# Patient Record
Sex: MALE | Race: WHITE | NOT HISPANIC OR LATINO | URBAN - METROPOLITAN AREA
[De-identification: names, ages, dates, MRNs, and addresses within clinical notes are randomized per-mention and may not be internally consistent; named-entity substitution may affect disease eponyms.]

---

## 2021-05-05 ENCOUNTER — WEB ENCOUNTER (OUTPATIENT)
Dept: URBAN - METROPOLITAN AREA CLINIC 74 | Facility: CLINIC | Age: 51
End: 2021-05-05

## 2021-05-05 ENCOUNTER — OFFICE VISIT (OUTPATIENT)
Dept: URBAN - METROPOLITAN AREA CLINIC 74 | Facility: CLINIC | Age: 51
End: 2021-05-05
Payer: MEDICARE

## 2021-05-05 ENCOUNTER — LAB OUTSIDE AN ENCOUNTER (OUTPATIENT)
Dept: URBAN - METROPOLITAN AREA CLINIC 74 | Facility: CLINIC | Age: 51
End: 2021-05-05

## 2021-05-05 DIAGNOSIS — E66.01 MORBID OBESITY: ICD-10-CM

## 2021-05-05 DIAGNOSIS — Z99.81 ON HOME OXYGEN THERAPY: ICD-10-CM

## 2021-05-05 DIAGNOSIS — I50.32 CHRONIC DIASTOLIC CONGESTIVE HEART FAILURE, NYHA CLASS 2: ICD-10-CM

## 2021-05-05 DIAGNOSIS — J96.11 CHRONIC HYPOXEMIC RESPIRATORY FAILURE: ICD-10-CM

## 2021-05-05 PROBLEM — 428283002 HISTORY OF POLYP OF COLON: Status: ACTIVE | Noted: 2021-05-05

## 2021-05-05 PROBLEM — 931000119107: Status: ACTIVE | Noted: 2021-05-05

## 2021-05-05 PROBLEM — 73430006: Status: ACTIVE | Noted: 2021-05-05

## 2021-05-05 PROCEDURE — 99242 OFF/OP CONSLTJ NEW/EST SF 20: CPT | Performed by: INTERNAL MEDICINE

## 2021-05-05 PROCEDURE — 99202 OFFICE O/P NEW SF 15 MIN: CPT | Performed by: INTERNAL MEDICINE

## 2021-05-05 RX ORDER — BUDESONIDE AND FORMOTEROL FUMARATE DIHYDRATE 160; 4.5 UG/1; UG/1
2 PUFFS AEROSOL RESPIRATORY (INHALATION) TWICE A DAY
Status: ACTIVE | COMMUNITY

## 2021-05-05 RX ORDER — FUROSEMIDE 20 MG/1
1 TABLET TABLET ORAL BID
Status: ACTIVE | COMMUNITY

## 2021-05-05 RX ORDER — LISINOPRIL 20 MG/1
1 TABLET TABLET ORAL ONCE A DAY
Status: ACTIVE | COMMUNITY

## 2021-05-05 RX ORDER — POLYETHYLENE GLYCOL 3350, SODIUM SULFATE, SODIUM CHLORIDE, POTASSIUM CHLORIDE, ASCORBIC ACID, SODIUM ASCORBATE 140-9-5.2G
AS DIRECTED KIT ORAL ONCE
Qty: 1 KIT | Refills: 0 | OUTPATIENT
Start: 2021-05-05 | End: 2021-05-06

## 2021-05-05 RX ORDER — SERTRALINE HYDROCHLORIDE 50 MG/1
1 TABLET TABLET, FILM COATED ORAL ONCE A DAY
Status: ACTIVE | COMMUNITY

## 2021-06-18 ENCOUNTER — TELEPHONE ENCOUNTER (OUTPATIENT)
Dept: URBAN - METROPOLITAN AREA CLINIC 74 | Facility: CLINIC | Age: 51
End: 2021-06-18

## 2021-06-22 ENCOUNTER — TELEPHONE ENCOUNTER (OUTPATIENT)
Dept: URBAN - METROPOLITAN AREA CLINIC 74 | Facility: CLINIC | Age: 51
End: 2021-06-22

## 2021-06-24 ENCOUNTER — OFFICE VISIT (OUTPATIENT)
Dept: URBAN - METROPOLITAN AREA MEDICAL CENTER 9 | Facility: MEDICAL CENTER | Age: 51
End: 2021-06-24
Payer: MEDICARE

## 2021-06-24 DIAGNOSIS — K63.89 BACTERIAL OVERGROWTH SYNDROME: ICD-10-CM

## 2021-06-24 DIAGNOSIS — Z12.11 COLON CANCER SCREENING: ICD-10-CM

## 2021-06-24 PROCEDURE — 45380 COLONOSCOPY AND BIOPSY: CPT | Performed by: INTERNAL MEDICINE

## 2021-07-07 ENCOUNTER — OFFICE VISIT (OUTPATIENT)
Dept: URBAN - METROPOLITAN AREA CLINIC 74 | Facility: CLINIC | Age: 51
End: 2021-07-07
Payer: MEDICARE

## 2021-07-07 DIAGNOSIS — K57.30 DIVERTICULA OF COLON: ICD-10-CM

## 2021-07-07 DIAGNOSIS — Z12.11 COLON CANCER SCREENING: ICD-10-CM

## 2021-07-07 PROBLEM — 733657002: Status: ACTIVE | Noted: 2021-07-07

## 2021-07-07 PROCEDURE — 99212 OFFICE O/P EST SF 10 MIN: CPT | Performed by: INTERNAL MEDICINE

## 2021-07-07 RX ORDER — FUROSEMIDE 20 MG/1
1 TABLET TABLET ORAL BID
Status: ACTIVE | COMMUNITY

## 2021-07-07 RX ORDER — LISINOPRIL 20 MG/1
1 TABLET TABLET ORAL ONCE A DAY
Status: ACTIVE | COMMUNITY

## 2021-07-07 RX ORDER — SERTRALINE HYDROCHLORIDE 50 MG/1
1 TABLET TABLET, FILM COATED ORAL ONCE A DAY
Status: ACTIVE | COMMUNITY

## 2021-07-07 RX ORDER — BUDESONIDE AND FORMOTEROL FUMARATE DIHYDRATE 160; 4.5 UG/1; UG/1
2 PUFFS AEROSOL RESPIRATORY (INHALATION) TWICE A DAY
Status: DISCONTINUED | COMMUNITY

## 2021-07-07 NOTE — HPI-TODAY'S VISIT:
Patient returns to clinic for follow-up after colonoscopy.  He had colonoscopy at the hospital due to congestive heart failure.  The preparation was fair but I was able to wash and suction to visualize 90% of the mucosa, no overt adenomatous polyps.  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  Diverticulosis.  Patient doing well now since procedure.

## 2021-10-04 ENCOUNTER — OFFICE VISIT (OUTPATIENT)
Dept: URBAN - METROPOLITAN AREA CLINIC 74 | Facility: CLINIC | Age: 51
End: 2021-10-04
Payer: MEDICARE

## 2021-10-04 ENCOUNTER — DASHBOARD ENCOUNTERS (OUTPATIENT)
Age: 51
End: 2021-10-04

## 2021-10-04 DIAGNOSIS — E66.01 MORBID OBESITY: ICD-10-CM

## 2021-10-04 DIAGNOSIS — I50.9 CONGESTIVE HEART FAILURE, UNSPECIFIED HF CHRONICITY, UNSPECIFIED HEART FAILURE TYPE: ICD-10-CM

## 2021-10-04 PROBLEM — 238136002: Status: ACTIVE | Noted: 2021-05-05

## 2021-10-04 PROBLEM — 42343007: Status: ACTIVE | Noted: 2021-10-04

## 2021-10-04 PROCEDURE — 99212 OFFICE O/P EST SF 10 MIN: CPT | Performed by: INTERNAL MEDICINE

## 2021-10-04 RX ORDER — FUROSEMIDE 20 MG/1
1 TABLET TABLET ORAL BID
Status: ACTIVE | COMMUNITY

## 2021-10-04 RX ORDER — LISINOPRIL 20 MG/1
1 TABLET TABLET ORAL ONCE A DAY
Status: ACTIVE | COMMUNITY

## 2021-10-04 RX ORDER — SERTRALINE HYDROCHLORIDE 50 MG/1
1 TABLET TABLET, FILM COATED ORAL ONCE A DAY
Status: ACTIVE | COMMUNITY

## 2021-10-04 NOTE — HPI-TODAY'S VISIT:
Patient returns to clinic for follow-up due to ongoing inability to lose weight. He has completed a zorim program and has spent much money, but unable to lose weight. BMI 55. He was told to see GI for weight loss recommendations. He had colonoscopy at the hospital due to congestive heart failure in July.  The preparation was fair but I was able to wash and suction to visualize 90% of the mucosa, no overt adenomatous polyps.  There was a  lipomatous ileocecal valve, benign biopsy.  Diverticulosis.    -- 	Essential hypertension 	BMI 50.0-59.9, adult (HCC) 	Chronic diastolic (congestive) heart failure (HCC) 	Morbidly obese (HCC) 	Chronic hypoxemic respiratory failure (HCC) 	Obstructive sleep apnea  History of morbid obesity, rheumatoid arthritis, chronic diastolic heart failure with mild to mod pulmonary hypertension, chronic hypoxemic respiratory failure on chronic oxygen as well as SARAH on BiPAP. He has chronic shortness of breath on exertion. He was previously followed at [facility] where he has had an extensive work up including RHC/LHC. He has no significant CAD. He denies chest pain, palpitations, dizziness, syncope.  Echocardiogram done by us in February showed NL LVSF, EF 55-60%, mild diastolic dysfunction, normal RV size and systolic function, PA 24. --Pt has not seen cardiology in about a year, EPIC notes reviewed.

## 2021-11-09 ENCOUNTER — APPOINTMENT (RX ONLY)
Dept: URBAN - METROPOLITAN AREA OTHER 10 | Facility: OTHER | Age: 51
Setting detail: DERMATOLOGY
End: 2021-11-09

## 2021-11-09 DIAGNOSIS — L85.8 OTHER SPECIFIED EPIDERMAL THICKENING: ICD-10-CM

## 2021-11-09 DIAGNOSIS — I87.2 VENOUS INSUFFICIENCY (CHRONIC) (PERIPHERAL): ICD-10-CM

## 2021-11-09 PROCEDURE — ? OTHER

## 2021-11-09 PROCEDURE — 99203 OFFICE O/P NEW LOW 30 MIN: CPT

## 2021-11-09 PROCEDURE — ? COUNSELING

## 2021-11-09 ASSESSMENT — LOCATION SIMPLE DESCRIPTION DERM
LOCATION SIMPLE: LEFT PRETIBIAL REGION
LOCATION SIMPLE: LEFT HAND
LOCATION SIMPLE: RIGHT HAND
LOCATION SIMPLE: RIGHT PRETIBIAL REGION

## 2021-11-09 ASSESSMENT — LOCATION ZONE DERM
LOCATION ZONE: HAND
LOCATION ZONE: LEG

## 2021-11-09 ASSESSMENT — LOCATION DETAILED DESCRIPTION DERM
LOCATION DETAILED: LEFT PROXIMAL PRETIBIAL REGION
LOCATION DETAILED: 3RD WEB SPACE LEFT HAND
LOCATION DETAILED: 3RD WEB SPACE RIGHT HAND
LOCATION DETAILED: RIGHT DISTAL PRETIBIAL REGION

## 2021-11-09 ASSESSMENT — SEVERITY ASSESSMENT: SEVERITY: ALMOST CLEAR

## 2021-11-09 NOTE — PROCEDURE: OTHER
Other (Free Text): Cerave SA cream
Render Risk Assessment In Note?: no
Detail Level: Zone
Note Text (......Xxx Chief Complaint.): This diagnosis correlates with the

## 2024-11-11 ENCOUNTER — LAB OUTSIDE AN ENCOUNTER (OUTPATIENT)
Dept: URBAN - METROPOLITAN AREA CLINIC 74 | Facility: CLINIC | Age: 54
End: 2024-11-11

## 2024-11-11 ENCOUNTER — OFFICE VISIT (OUTPATIENT)
Dept: URBAN - METROPOLITAN AREA CLINIC 74 | Facility: CLINIC | Age: 54
End: 2024-11-11
Payer: MEDICARE

## 2024-11-11 VITALS
TEMPERATURE: 98.2 F | BODY MASS INDEX: 42.66 KG/M2 | OXYGEN SATURATION: 93 % | HEART RATE: 111 BPM | HEIGHT: 72 IN | WEIGHT: 315 LBS | SYSTOLIC BLOOD PRESSURE: 124 MMHG | DIASTOLIC BLOOD PRESSURE: 68 MMHG

## 2024-11-11 DIAGNOSIS — I50.9 CONGESTIVE HEART FAILURE, UNSPECIFIED HF CHRONICITY, UNSPECIFIED HEART FAILURE TYPE: ICD-10-CM

## 2024-11-11 DIAGNOSIS — Z12.11 COLON CANCER SCREENING: ICD-10-CM

## 2024-11-11 DIAGNOSIS — E66.01 MORBID OBESITY: ICD-10-CM

## 2024-11-11 PROCEDURE — 99214 OFFICE O/P EST MOD 30 MIN: CPT | Performed by: INTERNAL MEDICINE

## 2024-11-11 RX ORDER — FUROSEMIDE 20 MG/1
1 TABLET TABLET ORAL BID
Status: ACTIVE | COMMUNITY

## 2024-11-11 RX ORDER — LISINOPRIL 20 MG/1
1 TABLET TABLET ORAL ONCE A DAY
Status: ACTIVE | COMMUNITY

## 2024-11-11 RX ORDER — SERTRALINE HYDROCHLORIDE 50 MG/1
1 TABLET TABLET, FILM COATED ORAL ONCE A DAY
Status: ACTIVE | COMMUNITY

## 2024-11-11 NOTE — HPI-TODAY'S VISIT:
-Follow up to schedule colonoscopy.  -2023 seen here, He was told to see GI for weight loss recommendations. We recommended bariatric surgery consult, not done. -He had colonoscopy at the hospital 2021.  The preparation was fair but I was able to wash and suction to visualize 90% of the mucosa, no overt adenomatous polyps.  There was a  lipomatous ileocecal valve, benign biopsy.  Diverticulosis. Told to repeat colonoscopy 2024, due now.  -- 	Essential hypertension 	BMI 50.0-59.9, adult (HCC) 	Chronic diastolic (congestive) heart failure (HCC) 	Morbidly obese (HCC) 	Chronic hypoxemic respiratory failure (HCC) 	Obstructive sleep apnea  History of morbid obesity, rheumatoid arthritis, chronic diastolic heart failure with mild to mod pulmonary hypertension, chronic hypoxemic respiratory failure on chronic oxygen as well as SARAH on BiPAP. He has chronic shortness of breath on exertion. He was previously followed at [facility] where he has had an extensive work up including RHC/LHC. He has no significant CAD. He denies chest pain, palpitations, dizziness, syncope.  Echocardiogram done by us in February showed NL LVSF, EF 55-60%, mild diastolic dysfunction, normal RV size and systolic function, PA 24. --Pt has not seen cardiology in about a year, EPIC notes reviewed.

## 2025-01-23 ENCOUNTER — OFFICE VISIT (OUTPATIENT)
Dept: URBAN - METROPOLITAN AREA MEDICAL CENTER 9 | Facility: MEDICAL CENTER | Age: 55
End: 2025-01-23
Payer: MEDICARE

## 2025-01-23 ENCOUNTER — TELEPHONE ENCOUNTER (OUTPATIENT)
Dept: URBAN - METROPOLITAN AREA CLINIC 40 | Facility: CLINIC | Age: 55
End: 2025-01-23

## 2025-01-23 DIAGNOSIS — Z12.11 COLON CANCER SCREENING: ICD-10-CM

## 2025-01-23 PROCEDURE — 0528F RCMND FLW-UP 10 YRS DOCD: CPT | Performed by: INTERNAL MEDICINE

## 2025-01-23 PROCEDURE — G0121 COLON CA SCRN NOT HI RSK IND: HCPCS | Performed by: INTERNAL MEDICINE

## 2025-01-23 RX ORDER — SERTRALINE HYDROCHLORIDE 50 MG/1
1 TABLET TABLET, FILM COATED ORAL ONCE A DAY
Status: ACTIVE | COMMUNITY

## 2025-01-23 RX ORDER — FUROSEMIDE 20 MG/1
1 TABLET TABLET ORAL BID
Status: ACTIVE | COMMUNITY

## 2025-01-23 RX ORDER — LISINOPRIL 20 MG/1
1 TABLET TABLET ORAL ONCE A DAY
Status: ACTIVE | COMMUNITY

## 2025-02-24 ENCOUNTER — OFFICE VISIT (OUTPATIENT)
Dept: URBAN - METROPOLITAN AREA CLINIC 74 | Facility: CLINIC | Age: 55
End: 2025-02-24